# Patient Record
Sex: FEMALE | Race: WHITE | NOT HISPANIC OR LATINO
[De-identification: names, ages, dates, MRNs, and addresses within clinical notes are randomized per-mention and may not be internally consistent; named-entity substitution may affect disease eponyms.]

---

## 2023-02-10 PROBLEM — Z00.00 ENCOUNTER FOR PREVENTIVE HEALTH EXAMINATION: Status: ACTIVE | Noted: 2023-02-10

## 2023-03-17 ENCOUNTER — APPOINTMENT (OUTPATIENT)
Dept: BREAST CENTER | Facility: CLINIC | Age: 78
End: 2023-03-17
Payer: MEDICARE

## 2023-03-17 ENCOUNTER — NON-APPOINTMENT (OUTPATIENT)
Age: 78
End: 2023-03-17

## 2023-03-17 VITALS
BODY MASS INDEX: 23.9 KG/M2 | DIASTOLIC BLOOD PRESSURE: 73 MMHG | HEIGHT: 64 IN | OXYGEN SATURATION: 97 % | HEART RATE: 64 BPM | WEIGHT: 140 LBS | SYSTOLIC BLOOD PRESSURE: 129 MMHG

## 2023-03-17 DIAGNOSIS — R92.8 OTHER ABNORMAL AND INCONCLUSIVE FINDINGS ON DIAGNOSTIC IMAGING OF BREAST: ICD-10-CM

## 2023-03-17 DIAGNOSIS — Z86.39 PERSONAL HISTORY OF OTHER ENDOCRINE, NUTRITIONAL AND METABOLIC DISEASE: ICD-10-CM

## 2023-03-17 DIAGNOSIS — Z78.9 OTHER SPECIFIED HEALTH STATUS: ICD-10-CM

## 2023-03-17 DIAGNOSIS — Z98.890 OTHER SPECIFIED POSTPROCEDURAL STATES: ICD-10-CM

## 2023-03-17 DIAGNOSIS — E78.5 HYPERLIPIDEMIA, UNSPECIFIED: ICD-10-CM

## 2023-03-17 DIAGNOSIS — Z87.891 PERSONAL HISTORY OF NICOTINE DEPENDENCE: ICD-10-CM

## 2023-03-17 DIAGNOSIS — Z86.79 PERSONAL HISTORY OF OTHER DISEASES OF THE CIRCULATORY SYSTEM: ICD-10-CM

## 2023-03-17 PROCEDURE — 99203 OFFICE O/P NEW LOW 30 MIN: CPT

## 2023-03-17 NOTE — REASON FOR VISIT
[Initial Evaluation] : an initial evaluation [FreeTextEntry1] : The patient is a postmenopausal female of [place] and [place] descent with no family history of breast or ovarian cancer.  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  She comes in now for a surgical evaluation.

## 2023-03-17 NOTE — HISTORY OF PRESENT ILLNESS
[FreeTextEntry1] : The patient is a 77-year-old G2, P2 postmenopausal white female of [place] and [place] descent.  She underwent menarche at age 11 and had her first child at age 26.  She underwent menopause in her late 40s and never took any hormone replacement therapy.  She has no family history of any cancer.  She underwent routine screening mammography and ultrasound at [facility] on January 23, 2023 and ultrasound showed an irregular right breast 12:00 hypoechoic density measuring 1.8 x 1.6 x 0.7 cm 6 cm from the nipple.  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  She comes in now for a surgical evaluation.

## 2023-03-17 NOTE — PHYSICAL EXAM
[Normocephalic] : normocephalic [Atraumatic] : atraumatic [EOMI] : extra ocular movement intact [Supple] : supple [No Supraclavicular Adenopathy] : no supraclavicular adenopathy [No Cervical Adenopathy] : no cervical adenopathy [Examined in the supine and seated position] : examined in the supine and seated position [No dominant masses] : no dominant masses in right breast  [No dominant masses] : no dominant masses left breast [No Nipple Retraction] : no left nipple retraction [No Nipple Discharge] : no left nipple discharge [Breast Mass Right Breast ___cm] : no masses [Breast Mass Left Breast ___cm] : no masses [Breast Nipple Inversion] : nipples not inverted [Breast Nipple Retraction] : nipples not retracted [Breast Nipple Flattening] : nipples not flattened [Breast Nipple Fissures] : nipples not fissured [Breast Abnormal Lactation (Galactorrhea)] : no galactorrhea [Breast Abnormal Secretion Bloody Fluid] : no bloody discharge [Breast Abnormal Secretion Serous Fluid] : no serous discharge [Breast Abnormal Secretion Opalescent Fluid] : no milky discharge [No Axillary Lymphadenopathy] : no left axillary lymphadenopathy [No Edema] : no edema [No Rashes] : no rashes [No Ulceration] : no ulceration [de-identified] : On exam, the patient has ptotic C-cup breasts.  On palpation, I cannot feel any suspicious densities in either breast even with close attention to the 12:00 region of the right breast.  She has no axillary, supraclavicular, or cervical adenopathy.

## 2024-04-18 NOTE — ASSESSMENT
[FreeTextEntry1] : The patient is a 77-year-old G2, P2 postmenopausal white female of [place] and [place] descent.  She underwent menarche at age 11 and had her first child at age 26.  She underwent menopause in her late 40s and never took any hormone replacement therapy.  She has no family history of any cancer.  She underwent routine screening mammography and ultrasound at [facility] on January 23, 2023 and ultrasound showed an irregular right breast 12:00 hypoechoic density measuring 1.8 x 1.6 x 0.7 cm 6 cm from the nipple.  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  There was a "ribbon" clip appropriate placed in the lesion.  Stromal fibrosis can definitely mimic a worrisome lesion and since the needle biopsy was performed directly in the lesion, I believe this can just be followed with a follow-up ultrasound in 6 months as described by the radiologist.  There is no need for any further diagnostic imaging at this time.  On exam, I cannot feel any suspicious densities in this right breast 12:00 region.  The patient was reassured and I would like to see her again in 6 months around September 2023 and she was given a prescription for a diagnostic directed right breast ultrasound due around July 2023.  If that is unchanged she will go back to her yearly mammography and ultrasound again in January 2024.
Loss